# Patient Record
Sex: FEMALE | Race: BLACK OR AFRICAN AMERICAN | NOT HISPANIC OR LATINO | URBAN - METROPOLITAN AREA
[De-identification: names, ages, dates, MRNs, and addresses within clinical notes are randomized per-mention and may not be internally consistent; named-entity substitution may affect disease eponyms.]

---

## 2022-09-09 ENCOUNTER — EMERGENCY (EMERGENCY)
Facility: HOSPITAL | Age: 27
LOS: 1 days | Discharge: HOME | End: 2022-09-09
Admitting: STUDENT IN AN ORGANIZED HEALTH CARE EDUCATION/TRAINING PROGRAM
Payer: COMMERCIAL

## 2022-09-09 VITALS
RESPIRATION RATE: 17 BRPM | TEMPERATURE: 98 F | DIASTOLIC BLOOD PRESSURE: 78 MMHG | WEIGHT: 106.92 LBS | OXYGEN SATURATION: 98 % | SYSTOLIC BLOOD PRESSURE: 117 MMHG | HEART RATE: 86 BPM | HEIGHT: 61 IN

## 2022-09-09 DIAGNOSIS — M25.462 EFFUSION, LEFT KNEE: ICD-10-CM

## 2022-09-09 DIAGNOSIS — M25.562 PAIN IN LEFT KNEE: ICD-10-CM
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
## 2022-09-09 PROCEDURE — 99282 EMERGENCY DEPT VISIT SF MDM: CPT

## 2022-09-09 NOTE — ED ADULT NURSE NOTE - OBJECTIVE STATEMENT
26 yo F presents to ED co L knee pain and swelling after hitting knee on coffee table 2 days ago. Pt ambulatory today into ED with limp favoring R leg. Denies inability to ambulate, numbness, tingling.

## 2022-09-09 NOTE — ED PROVIDER NOTE - PATIENT PORTAL LINK FT
You can access the FollowMyHealth Patient Portal offered by [facility] by registering at the following website: [email or web address]. By joining [facility]’s FollowMyHealth portal, you will also be able to view your health information using other applications (apps) compatible with our system.

## 2022-09-09 NOTE — ED ADULT TRIAGE NOTE - CHIEF COMPLAINT QUOTE
"I hit my knee against a coffee table 2 days ago," c/o pain to left knee with swelling. Pt able to ambulate.

## 2022-09-09 NOTE — ED ADULT NURSE NOTE - NSIMPLEMENTINTERV_GEN_ALL_ED
Implemented All Fall Risk Interventions:  [place] to call system. Call bell, personal items and telephone within reach. Instruct patient to call for assistance. Room bathroom lighting operational. Non-slip footwear when patient is off stretcher. Physically safe environment: no spills, clutter or unnecessary equipment. Stretcher in lowest position, wheels locked, appropriate side rails in place. Provide visual cue, wrist band, yellow gown, etc. Monitor gait and stability. Monitor for mental status changes and reorient to person, place, and time. Review medications for side effects contributing to fall risk. Reinforce activity limits and safety measures with patient and family.

## 2022-09-09 NOTE — ED PROVIDER NOTE - NSFOLLOWUPINSTRUCTIONS_ED_ALL_ED_FT
Knee Effusion      Knee effusion means that you have extra fluid in your knee. This can cause pain and swelling. Your knee may be more difficult to bend and move.    Common causes of knee effusion are:  •Arthritis.      •Infection.      •Injury.      •Autoimmune disease. This means that your body's defense system (immune system) mistakenly attacks healthy body tissues.        Follow these instructions at home:    If you have a brace or an immobilizer:     •Wear it as told by your doctor. Take it off only as told by your doctor.      •Check the skin around it every day. Tell your doctor if you see problems.    •Loosen the brace or immobilizer if your toes:  •Tingle.      •Become numb.      •Turn cold and blue.        •Keep the brace or immobilizer clean.    •If the brace or immobilizer is not waterproof:  •Do not let it get wet.      •Cover it with a watertight covering when you take a bath or shower.          Managing pain, stiffness, and swelling    •If told, put ice on the affected area. To do this:  •If you have a removable brace or immobilizer, take it off as told by your doctor.      •Put ice in a plastic bag.      •Place a towel between your skin and the bag.      •Leave the ice on for 20 minutes, 2–3 times a day.      •Take off the ice if your skin turns bright red. This is very important. If you cannot feel pain, heat, or cold, you have a greater risk of damage to the area.        •Move your toes often.      •Raise your knee above the level of your heart while you are sitting or lying down.      Activity     •Rest as told by your doctor.      •Use crutches to support your body weight. Do not use your injured leg to support your body weight until your doctor says that you can.      •Do exercises as told by your doctor.      General instructions     •Take over-the-counter and prescription medicines only as told by your doctor.      • Do not smoke or use any products that contain nicotine or tobacco. If you need help quitting, ask your doctor.      •Wear an elastic bandage or a wrap that puts pressure on your knee (compression wrap) as told by your doctor.      •Keep all follow-up visits.        Contact a doctor if:    •You continue to have pain in your knee.      •You have a fever or chills.        Get help right away if:    •You have swelling or redness in your knee that gets worse or does not get better.      •You have very bad pain in your knee.        Summary    •Knee effusion is when you have extra fluid in your knee. This can cause pain and swelling and can make it hard to bend and move your knee.      •Take over-the-counter and prescription medicines only as told by your doctor.      •If you have a brace or an immobilizer, wear it as told by your doctor.      This information is not intended to replace advice given to you by your health care provider. Make sure you discuss any questions you have with your health care provider.      Document Revised: 08/18/2021 Document Reviewed: 08/18/2021    [employer]vier Patient Education © 2022 [employer]

## 2022-09-09 NOTE — ED PROVIDER NOTE - OBJECTIVE STATEMENT
26 y/o f wth no pmh present to ED c/o left knee pain s/p banging knee Wednesday. Patient report of mild swelling, but able to weight bear, flex and extend knee. No h/o of prior injuries. Denies numbness, tingling, hip or ankle pain,
